# Patient Record
Sex: FEMALE | Race: BLACK OR AFRICAN AMERICAN | NOT HISPANIC OR LATINO | Employment: UNEMPLOYED | URBAN - NONMETROPOLITAN AREA
[De-identification: names, ages, dates, MRNs, and addresses within clinical notes are randomized per-mention and may not be internally consistent; named-entity substitution may affect disease eponyms.]

---

## 2021-05-25 ENCOUNTER — HOSPITAL ENCOUNTER (OUTPATIENT)
Dept: RADIOLOGY | Facility: HOSPITAL | Age: 46
Discharge: HOME | End: 2021-05-25
Attending: NURSE PRACTITIONER
Payer: COMMERCIAL

## 2021-05-25 VITALS — BODY MASS INDEX: 47.09 KG/M2 | WEIGHT: 293 LBS | HEIGHT: 66 IN

## 2021-05-25 DIAGNOSIS — E04.9 NON-TOXIC GOITER: ICD-10-CM

## 2021-05-25 DIAGNOSIS — Z12.31 BREAST CANCER SCREENING BY MAMMOGRAM: ICD-10-CM

## 2021-05-25 PROCEDURE — 77067 MAMMO DIGITAL SCREENING BILAT: ICD-10-PCS | Mod: 26,,, | Performed by: RADIOLOGY

## 2021-05-25 PROCEDURE — 76536 US EXAM OF HEAD AND NECK: CPT | Mod: TC

## 2021-05-25 PROCEDURE — 77067 SCR MAMMO BI INCL CAD: CPT | Mod: 26,,, | Performed by: RADIOLOGY

## 2021-05-25 PROCEDURE — 77067 SCR MAMMO BI INCL CAD: CPT | Mod: TC

## 2022-12-06 ENCOUNTER — HOSPITAL ENCOUNTER (OUTPATIENT)
Dept: RADIOLOGY | Facility: HOSPITAL | Age: 47
Discharge: HOME | End: 2022-12-06
Attending: NURSE PRACTITIONER
Payer: COMMERCIAL

## 2022-12-06 VITALS — BODY MASS INDEX: 47.09 KG/M2 | HEIGHT: 66 IN | WEIGHT: 293 LBS

## 2022-12-06 DIAGNOSIS — Z12.31 BREAST CANCER SCREENING BY MAMMOGRAM: ICD-10-CM

## 2022-12-06 PROCEDURE — 77067 SCR MAMMO BI INCL CAD: CPT | Mod: TC

## 2023-09-23 ENCOUNTER — HOSPITAL ENCOUNTER (EMERGENCY)
Facility: HOSPITAL | Age: 48
Discharge: HOME | End: 2023-09-23
Payer: COMMERCIAL

## 2023-09-23 VITALS
RESPIRATION RATE: 18 BRPM | WEIGHT: 256 LBS | SYSTOLIC BLOOD PRESSURE: 154 MMHG | OXYGEN SATURATION: 98 % | BODY MASS INDEX: 41.14 KG/M2 | DIASTOLIC BLOOD PRESSURE: 91 MMHG | HEIGHT: 66 IN | TEMPERATURE: 98 F | HEART RATE: 67 BPM

## 2023-09-23 DIAGNOSIS — K59.00 CONSTIPATION, UNSPECIFIED CONSTIPATION TYPE: ICD-10-CM

## 2023-09-23 DIAGNOSIS — R10.11 RIGHT UPPER QUADRANT ABDOMINAL PAIN: Primary | ICD-10-CM

## 2023-09-23 PROCEDURE — 99283 PR EMERGENCY DEPT VISIT,LEVEL III: ICD-10-PCS | Mod: ,,, | Performed by: REGISTERED NURSE

## 2023-09-23 PROCEDURE — 99283 EMERGENCY DEPT VISIT LOW MDM: CPT | Mod: ,,, | Performed by: REGISTERED NURSE

## 2023-09-23 PROCEDURE — 99283 EMERGENCY DEPT VISIT LOW MDM: CPT

## 2023-09-23 RX ORDER — ATENOLOL AND CHLORTHALIDONE TABLET 50; 25 MG/1; MG/1
1 TABLET ORAL DAILY
COMMUNITY

## 2023-09-23 RX ORDER — METFORMIN HYDROCHLORIDE 500 MG/1
500 TABLET ORAL
COMMUNITY

## 2023-09-24 ENCOUNTER — TELEPHONE (OUTPATIENT)
Dept: EMERGENCY MEDICINE | Facility: HOSPITAL | Age: 48
End: 2023-09-24
Payer: COMMERCIAL

## 2023-09-24 NOTE — ED PROVIDER NOTES
"Encounter Date: 9/23/2023       History     Chief Complaint   Patient presents with    Abdominal Pain     RUQ pain x 3 weeks     Violet Graham is a 47 yo AAF that pressnts with intermittent RUQ pain for 3 weeks.  Pt states it feels like gas and when she takes Gas X or passes flatus the pain improves.  Reports bloating and a feeling of fullness during these episodes.  States this all started after she had Covid in August 2023.  States her last BM was this morning and was normal.  Denies N/V or other c/o.    The history is provided by the patient.     Review of patient's allergies indicates:   Allergen Reactions    Reglan [metoclopramide]      Past Medical History:   Diagnosis Date    Diabetes mellitus     Hypertension      Past Surgical History:   Procedure Laterality Date    BREAST BIOPSY      TUBAL LIGATION       Family History   Problem Relation Age of Onset    Hyperlipidemia Mother     Diabetes Mother     Hypertension Mother     Heart disease Father      Social History     Tobacco Use    Smoking status: Never    Smokeless tobacco: Never   Substance Use Topics    Alcohol use: Never    Drug use: Never     Review of Systems   Constitutional:  Negative for fever.   Gastrointestinal:  Positive for abdominal distention ("at times") and abdominal pain (RUQ). Negative for blood in stool, constipation, diarrhea, nausea and vomiting.   Genitourinary:  Negative for dysuria.   Musculoskeletal:  Negative for back pain.   All other systems reviewed and are negative.      Physical Exam     Initial Vitals [09/23/23 1953]   BP Pulse Resp Temp SpO2   (!) 154/91 67 18 98.2 °F (36.8 °C) 98 %      MAP       --         Physical Exam    Constitutional: She appears well-developed and well-nourished. She is not diaphoretic. She is Obese . She is cooperative.  Non-toxic appearance. She does not have a sickly appearance. She does not appear ill. No distress.   HENT:   Head: Normocephalic and atraumatic.   Right Ear: External ear normal. "   Left Ear: External ear normal.   Nose: Nose normal.   Mouth/Throat: Oropharynx is clear and moist. No oropharyngeal exudate.   Eyes: EOM are normal. Pupils are equal, round, and reactive to light.   Neck: Neck supple.   Normal range of motion.  Cardiovascular:  Normal rate, regular rhythm, normal heart sounds and intact distal pulses.           Pulmonary/Chest: Breath sounds normal. No respiratory distress.   Abdominal: Abdomen is soft. Bowel sounds are normal. There is no abdominal tenderness.   Musculoskeletal:         General: Normal range of motion.      Cervical back: Normal range of motion and neck supple.     Neurological: She is alert and oriented to person, place, and time. She has normal strength. GCS score is 15. GCS eye subscore is 4. GCS verbal subscore is 5. GCS motor subscore is 6.   Skin: Skin is warm and dry. Capillary refill takes less than 2 seconds.   Psychiatric: She has a normal mood and affect. Her behavior is normal. Judgment and thought content normal.         Medical Screening Exam   See Full Note    ED Course   Procedures  Labs Reviewed - No data to display       Imaging Results              X-Ray KUB (Final result)  Result time 09/23/23 20:24:06      Final result by Josh Vizcarra II, MD (09/23/23 20:24:06)                   Impression:      Increased stool volume in the colon, may indicate constipation.      Electronically signed by: Josh Vizcarra  Date:    09/23/2023  Time:    20:24               Narrative:    EXAMINATION:  XR KUB    CLINICAL HISTORY:  abdominal pain;    COMPARISON:  [facility] 2017    FINDINGS:  No free fluid or free air seen.  Increased stool volume is seen in the colon.  The bowel gas pattern otherwise appears within normal limits.  No abnormal calcifications are present.  No other abnormality is identified.                                       Medications - No data to display  Medical Decision Making  Violet Graham is a 49 yo AAF that pressnts with  intermittent RUQ pain for 3 weeks.  Pt states it feels like gas and when she takes Gas X or passes flatus the pain improves.  Reports bloating and a feeling of fullness during these episodes.  States this all started after she had Covid in August 2023.  States her last BM was this morning and was normal.  Denies N/V or other c/o.    -Gallbladder disease  -Constipation  -IBS    -Informed pt of radiology results of increased stool in colon.  -Will discharge home with the following instructions:  Milk of Magnesia with increased water intake.  Once your bowels start to move, add Miralax daily as directed on the bottle.  Increased fiber in your diet.  Follow up with your regular provider if symptoms fail to improve.  Pt verbalized understanding and is in agreement with the plan of care.      -    Amount and/or Complexity of Data Reviewed  Radiology: ordered.     Details: 09/23/23 2026  X-Ray KUB   Performed: 09/23/23 2021  Final         Impression:  Increased stool volume in the colon, may indicate constipation. Electronically signed by: Josh Vizcarra Date: 09/23/2023 Time: 20:24                                      Clinical Impression:   Final diagnoses:  [R10.11] Right upper quadrant abdominal pain (Primary)  [K59.00] Constipation, unspecified constipation type        ED Disposition Condition    Discharge Stable          ED Prescriptions    None       Follow-up Information       Follow up With Specialties Details Why Contact Info    Roxanne Ferreira FNP Family Medicine In 2 days If symptoms worsen or are not improved [id number]  [facility]  [place] [id number]  [phone]               aDisy Arciniega, NP-GILDARDO  09/24/23 [id number]

## 2023-09-24 NOTE — DISCHARGE INSTRUCTIONS
-Milk of Magnesia with increased water intake  -Once your bowels start to move, add Miralax daily as directed on the bottle  -Increased fiber in your diet  -Follow up with your regular provider if symptoms fail to improve

## 2023-09-24 NOTE — ED TRIAGE NOTES
Rec'd ambulatory 49 y/o F to triage w/ c/o intermittent RUQ pain x 3 weeks. States it started after she had COVID the first week of August. Describes pain as sharp at times, believes it is possibly gas. States it does get better when she passes gas. LBM today x 2. Describes as normal.

## 2024-02-12 ENCOUNTER — HOSPITAL ENCOUNTER (OUTPATIENT)
Dept: RADIOLOGY | Facility: HOSPITAL | Age: 49
Discharge: HOME | End: 2024-02-12
Payer: COMMERCIAL

## 2024-02-12 VITALS — WEIGHT: 253 LBS | HEIGHT: 66 IN | BODY MASS INDEX: 40.66 KG/M2

## 2024-02-12 DIAGNOSIS — Z12.31 OTHER SCREENING MAMMOGRAM: ICD-10-CM

## 2024-02-12 PROCEDURE — 77067 SCR MAMMO BI INCL CAD: CPT | Mod: TC

## 2024-04-03 ENCOUNTER — HOSPITAL ENCOUNTER (OUTPATIENT)
Dept: RADIOLOGY | Facility: HOSPITAL | Age: 49
Discharge: HOME | End: 2024-04-03
Attending: NURSE PRACTITIONER
Payer: COMMERCIAL

## 2024-04-03 DIAGNOSIS — M25.562 LEFT KNEE PAIN: ICD-10-CM

## 2024-04-03 PROCEDURE — 73562 X-RAY EXAM OF KNEE 3: CPT | Mod: TC,LT

## 2024-05-22 ENCOUNTER — HOSPITAL ENCOUNTER (OUTPATIENT)
Dept: RADIOLOGY | Facility: HOSPITAL | Age: 49
Discharge: HOME | End: 2024-05-22
Attending: NURSE PRACTITIONER
Payer: COMMERCIAL

## 2024-05-22 DIAGNOSIS — E04.1 THYROID NODULE: ICD-10-CM

## 2024-05-22 PROCEDURE — 76536 US EXAM OF HEAD AND NECK: CPT | Mod: TC

## 2024-06-20 PROCEDURE — 88305 TISSUE EXAM BY PATHOLOGIST: CPT | Mod: 26,,, | Performed by: PATHOLOGY

## 2024-06-20 PROCEDURE — 88305 TISSUE EXAM BY PATHOLOGIST: CPT | Mod: TC,SUR

## 2024-06-21 ENCOUNTER — LAB REQUISITION (OUTPATIENT)
Dept: LAB | Facility: HOSPITAL | Age: 49
End: 2024-06-21
Payer: COMMERCIAL

## 2024-06-21 DIAGNOSIS — D49.2 NEOPLASM OF UNSPECIFIED BEHAVIOR OF BONE, SOFT TISSUE, AND SKIN: ICD-10-CM

## 2024-06-24 LAB
ESTROGEN SERPL-MCNC: NORMAL PG/ML
INSULIN SERPL-ACNC: NORMAL U[IU]/ML
LAB AP GROSS DESCRIPTION: NORMAL
LAB AP LABORATORY NOTES: NORMAL
LAB AP SPEC A DDX: NORMAL
LAB AP SPEC A MORPHOLOGY: NORMAL
LAB AP SPEC A PROCEDURE: NORMAL
T3RU NFR SERPL: NORMAL %